# Patient Record
Sex: FEMALE | Race: WHITE | NOT HISPANIC OR LATINO | Employment: FULL TIME | URBAN - METROPOLITAN AREA
[De-identification: names, ages, dates, MRNs, and addresses within clinical notes are randomized per-mention and may not be internally consistent; named-entity substitution may affect disease eponyms.]

---

## 2023-03-08 ENCOUNTER — HOSPITAL ENCOUNTER (EMERGENCY)
Facility: HOSPITAL | Age: 23
Discharge: HOME | End: 2023-03-09
Attending: STUDENT IN AN ORGANIZED HEALTH CARE EDUCATION/TRAINING PROGRAM
Payer: COMMERCIAL

## 2023-03-08 DIAGNOSIS — R06.02 SOB (SHORTNESS OF BREATH): ICD-10-CM

## 2023-03-08 DIAGNOSIS — F41.9 ANXIETY: ICD-10-CM

## 2023-03-08 DIAGNOSIS — R07.9 CHEST PAIN: Primary | ICD-10-CM

## 2023-03-08 LAB
ALBUMIN SERPL-MCNC: 4.6 G/DL (ref 3.5–5)
ALBUMIN/GLOB SERPL: 1.6 RATIO (ref 1.1–2)
ALP SERPL-CCNC: 60 UNIT/L (ref 40–150)
ALT SERPL-CCNC: 11 UNIT/L (ref 0–55)
AST SERPL-CCNC: 16 UNIT/L (ref 5–34)
BILIRUBIN DIRECT+TOT PNL SERPL-MCNC: 1.8 MG/DL
BUN SERPL-MCNC: 5.5 MG/DL (ref 7–18.7)
CALCIUM SERPL-MCNC: 9.2 MG/DL (ref 8.4–10.2)
CHLORIDE SERPL-SCNC: 107 MMOL/L (ref 98–107)
CO2 SERPL-SCNC: 22 MMOL/L (ref 22–29)
CREAT SERPL-MCNC: 0.69 MG/DL (ref 0.55–1.02)
D DIMER PPP IA.FEU-MCNC: 0.53 UG/ML FEU (ref 0–0.5)
ERYTHROCYTE [DISTWIDTH] IN BLOOD BY AUTOMATED COUNT: 13.5 % (ref 11.5–17)
FLUAV AG UPPER RESP QL IA.RAPID: NOT DETECTED
FLUBV AG UPPER RESP QL IA.RAPID: NOT DETECTED
GFR SERPLBLD CREATININE-BSD FMLA CKD-EPI: >60 MLS/MIN/1.73/M2
GLOBULIN SER-MCNC: 2.9 GM/DL (ref 2.4–3.5)
GLUCOSE SERPL-MCNC: 90 MG/DL (ref 74–100)
HCT VFR BLD AUTO: 41.2 % (ref 37–47)
HGB BLD-MCNC: 13.7 G/DL (ref 12–16)
MCH RBC QN AUTO: 28.2 PG
MCHC RBC AUTO-ENTMCNC: 33.3 G/DL (ref 33–36)
MCV RBC AUTO: 84.9 FL (ref 80–94)
NEUTROPHILS NFR BLD AUTO: 53 %
NRBC BLD AUTO-RTO: 0 %
PLATELET # BLD AUTO: 222 X10(3)/MCL (ref 130–400)
PMV BLD AUTO: 9.7 FL (ref 7.4–10.4)
POTASSIUM SERPL-SCNC: 3.8 MMOL/L (ref 3.5–5.1)
PROT SERPL-MCNC: 7.5 GM/DL (ref 6.4–8.3)
RBC # BLD AUTO: 4.85 X10(6)/MCL (ref 4.2–5.4)
SARS-COV-2 RNA RESP QL NAA+PROBE: NOT DETECTED
SODIUM SERPL-SCNC: 136 MMOL/L (ref 136–145)
TROPONIN I SERPL-MCNC: <0.01 NG/ML (ref 0–0.04)
TSH SERPL-ACNC: 2.68 UIU/ML (ref 0.35–4.94)
WBC # SPEC AUTO: 6.4 X10(3)/MCL (ref 4.5–11.5)

## 2023-03-08 PROCEDURE — 85379 FIBRIN DEGRADATION QUANT: CPT | Performed by: STUDENT IN AN ORGANIZED HEALTH CARE EDUCATION/TRAINING PROGRAM

## 2023-03-08 PROCEDURE — 93010 ELECTROCARDIOGRAM REPORT: CPT | Mod: ,,, | Performed by: INTERNAL MEDICINE

## 2023-03-08 PROCEDURE — 99285 EMERGENCY DEPT VISIT HI MDM: CPT | Mod: 25

## 2023-03-08 PROCEDURE — 85025 COMPLETE CBC W/AUTO DIFF WBC: CPT

## 2023-03-08 PROCEDURE — 93005 ELECTROCARDIOGRAM TRACING: CPT

## 2023-03-08 PROCEDURE — 84484 ASSAY OF TROPONIN QUANT: CPT

## 2023-03-08 PROCEDURE — 0240U COVID/FLU A&B PCR: CPT

## 2023-03-08 PROCEDURE — 93010 EKG 12-LEAD: ICD-10-PCS | Mod: ,,, | Performed by: INTERNAL MEDICINE

## 2023-03-08 PROCEDURE — 80053 COMPREHEN METABOLIC PANEL: CPT

## 2023-03-08 PROCEDURE — 25000003 PHARM REV CODE 250: Performed by: STUDENT IN AN ORGANIZED HEALTH CARE EDUCATION/TRAINING PROGRAM

## 2023-03-08 PROCEDURE — 84443 ASSAY THYROID STIM HORMONE: CPT

## 2023-03-08 RX ORDER — ACETAMINOPHEN 500 MG
1000 TABLET ORAL
Status: COMPLETED | OUTPATIENT
Start: 2023-03-08 | End: 2023-03-08

## 2023-03-08 RX ADMIN — ACETAMINOPHEN 1000 MG: 500 TABLET ORAL at 10:03

## 2023-03-09 VITALS
DIASTOLIC BLOOD PRESSURE: 70 MMHG | RESPIRATION RATE: 14 BRPM | WEIGHT: 125 LBS | OXYGEN SATURATION: 100 % | HEART RATE: 72 BPM | BODY MASS INDEX: 20.83 KG/M2 | SYSTOLIC BLOOD PRESSURE: 121 MMHG | TEMPERATURE: 98 F | HEIGHT: 65 IN

## 2023-03-09 PROCEDURE — 25500020 PHARM REV CODE 255: Performed by: STUDENT IN AN ORGANIZED HEALTH CARE EDUCATION/TRAINING PROGRAM

## 2023-03-09 RX ORDER — ALPRAZOLAM 0.5 MG/1
0.5 TABLET ORAL 3 TIMES DAILY PRN
Qty: 15 TABLET | Refills: 0 | Status: SHIPPED | OUTPATIENT
Start: 2023-03-09

## 2023-03-09 RX ADMIN — IOPAMIDOL 100 ML: 755 INJECTION, SOLUTION INTRAVENOUS at 12:03

## 2023-03-09 NOTE — FIRST PROVIDER EVALUATION
"Medical screening examination initiated.  I have conducted a focused provider triage encounter, findings are as follows:    Brief history of present illness:  22 year old female presents to Er with intermittent left sided chest pain radiating to neck with intermittent SOB.    Vitals:    03/08/23 1812   BP: 122/80   BP Location: Left arm   Patient Position: Sitting   Pulse: 86   Resp: 19   Temp: 99.3 °F (37.4 °C)   SpO2: 99%   Weight: 56.7 kg (125 lb)   Height: 5' 5" (1.651 m)       Pertinent physical exam:  Awake and alert, nad    Brief workup plan:  Labs, EKG, CXR    Preliminary workup initiated; this workup will be continued and followed by the physician or advanced practice provider that is assigned to the patient when roomed.  "

## 2023-03-09 NOTE — ED PROVIDER NOTES
Encounter Date: 3/8/2023    SCRIBE #1 NOTE: I, Denise Elias, am scribing for, and in the presence of,  Reji Anderson IV, MD. I have scribed the following portions of the note - the EKG reading. Other sections scribed: HPI, ROS, PE, MDM.     History     Chief Complaint   Patient presents with    Chest Pain     C/o left sides chest tightness that radiates to the neck and LUE. Pt reports intermittent episodes for the last 3 days. Associated sx include SOB. Denies family hx of MI or cardiac history. Denies fever.      22 Y.O. female presents to the ED for L-sided CP onset 3 days ago. Pt states that the CP mostly occurs while in high stress conditions at work and radiates to her L neck and L arm; accompanied by SOB and palpitations. Further states that the pain also occurs with deep breaths. Reports pain relief if she puts pressure on her chest. Denies cough, leg swelling, N/V, and leg pain. Admits to 12 hour car ride two weeks ago. Further denies PMHx and recent surgery.    Per Chart Review: Seen at  for sore throat in December 2022.     The history is provided by the patient. No  was used.   Review of patient's allergies indicates:   Allergen Reactions    Aloe vera      No past medical history on file.  No past surgical history on file.  No family history on file.  Social History     Tobacco Use    Smoking status: Never    Smokeless tobacco: Never     Review of Systems   Constitutional:  Negative for chills and fever.   HENT:  Negative for congestion, rhinorrhea and sore throat.    Eyes:  Negative for visual disturbance.   Respiratory:  Positive for shortness of breath. Negative for cough.    Cardiovascular:  Positive for chest pain and palpitations. Negative for leg swelling.   Gastrointestinal:  Negative for abdominal pain, nausea and vomiting.   Genitourinary:  Negative for dysuria, hematuria, vaginal bleeding and vaginal discharge.   Musculoskeletal:  Negative for joint swelling.   Skin:   Negative for rash.   Neurological:  Negative for weakness.   Psychiatric/Behavioral:  Negative for confusion.      Physical Exam     Initial Vitals [03/08/23 1812]   BP Pulse Resp Temp SpO2   122/80 86 19 99.3 °F (37.4 °C) 99 %      MAP       --         Physical Exam    Nursing note and vitals reviewed.  Constitutional: She is not diaphoretic. No distress.   Cardiovascular:  Normal rate and regular rhythm.           No murmur heard.  Pulmonary/Chest: Breath sounds normal. No respiratory distress. She has no wheezes. She has no rales.   Abdominal: Abdomen is soft. She exhibits no distension. There is no abdominal tenderness.     Neurological: She is alert.   Psychiatric: She has a normal mood and affect.       ED Course   Procedures  Labs Reviewed   COMPREHENSIVE METABOLIC PANEL - Abnormal; Notable for the following components:       Result Value    Blood Urea Nitrogen 5.5 (*)     Bilirubin Total 1.8 (*)     All other components within normal limits   D DIMER, QUANTITATIVE - Abnormal; Notable for the following components:    D-Dimer 0.53 (*)     All other components within normal limits   TROPONIN I - Normal   TSH - Normal   COVID/FLU A&B PCR - Normal    Narrative:     The Xpert Xpress SARS-CoV-2/FLU/RSV plus is a rapid, multiplexed real-time PCR test intended for the simultaneous qualitative detection and differentiation of SARS-CoV-2, Influenza A, Influenza B, and respiratory syncytial virus (RSV) viral RNA in either nasopharyngeal swab or nasal swab specimens.         CBC W/ AUTO DIFFERENTIAL    Narrative:     The following orders were created for panel order CBC Auto Differential.  Procedure                               Abnormality         Status                     ---------                               -----------         ------                     CBC with Differential[[id number]]                            Final result                 Please view results for these tests on the individual orders.   CBC WITH  DIFFERENTIAL     EKG Readings: (Independently Interpreted)   Initial Reading: No STEMI. Rhythm: Normal Sinus Rhythm. Heart Rate: 79. Ectopy: No Ectopy. Conduction: Normal. ST Segments: Normal ST Segments. T Waves: Normal. Clinical Impression: Normal Sinus Rhythm   EKG performed at 1819 on 3/8/2023.   ECG Results              EKG 12-lead (Final result)  Result time 03/08/23 23:21:46      Final result by Interface, Lab In [facility] (03/08/23 23:21:46)                   Narrative:    Test Reason :     Vent. Rate : 079 BPM     Atrial Rate : 079 BPM     P-R Int : 130 ms          QRS Dur : 086 ms      QT Int : 386 ms       P-R-T Axes : 085 090 070 degrees     QTc Int : 442 ms    Normal sinus rhythm with sinus arrhythmia  Rightward axis  Borderline Abnormal ECG  When compared with ECG of 08-MAR-2023 18:18,  No significant change was found  Confirmed by Suleiman Zaldivar MD ([id number]) on 3/8/2023 11:21:34 PM    Referred By: MATTHEW DE LA PAZ           Confirmed By:Suleiman Zaldivar MD                                     EKG 12-lead (Final result)  Result time 03/08/23 23:21:41      Final result by Interface, Lab In [facility] (03/08/23 23:21:41)                   Narrative:    Test Reason : R07.9,    Vent. Rate : 078 BPM     Atrial Rate : 078 BPM     P-R Int : 134 ms          QRS Dur : 086 ms      QT Int : 380 ms       P-R-T Axes : 090 095 083 degrees     QTc Int : 433 ms     Suspect arm lead reversal, interpretation assumes no reversal  Normal sinus rhythm with sinus arrhythmia  Rightward axis  Borderline Abnormal ECG  No previous ECGs available  Confirmed by Suleiman Zaldivar MD ([id number]) on 3/8/2023 11:21:31 PM    Referred By:             Confirmed By:Suleiman Zaldivar MD                                  Imaging Results              CTA Chest Non-Coronary (PE Studies) (Preliminary result)  Result time 03/09/23 08:04:57      Preliminary result by Jorge Alberto Jay MD (03/09/23 08:04:57)                   Narrative:    START OF REPORT:  TECHNIQUE: CT SCAN OF  THE CHEST WAS PERFORMED WITH INTRAVENOUS CONTRAST WITH DIRECT AXIAL IMAGES AS WELL AS SAGITTAL AND CORONAL RECONSTRUCTION IMAGES PULMONARY EMBOLUS PROTOCOL.    DOSAGE INFORMATION: AUTOMATED EXPOSURE CONTROL WAS UTILIZED.    COMPARISON: NONE.    CLINICAL HISTORY: LEFT SIDE CHEST PAIN, SOB.    Findings:  Soft Tissues: Unremarkable.  Mediastinum: The mediastinal structures are within normal limits.  Heart: The heart size is within normal limits.  Aorta: Unremarkable appearing aorta.  Pulmonary Arteries: No filling defects are seen in the pulmonary arteries to suggest pulmonary embolus.  Lungs: The lungs are clear with no focal infiltrate or airspace disease. No acute focal infiltrate or consolidation is seen.  Pleura: No effusions or pneumothorax are identified.  Bony Structures: The visualized bony structures appear unremarkable.  Abdomen: The visualized upper abdominal organs appear unremarkable.      Impression:  1. No filling defects are seen in the pulmonary arteries to suggest pulmonary embolus.  2. No acute focal infiltrate or consolidation is seen.  3. No acute intrathoracic process identified. Details and other findings as discussed above.                          Preliminary result by Interface, Rad Results In (03/09/23 01:14:08)                   Narrative:    START OF REPORT:  TECHNIQUE: CT SCAN OF THE CHEST WAS PERFORMED WITH INTRAVENOUS CONTRAST WITH DIRECT AXIAL IMAGES AS WELL AS SAGITTAL AND CORONAL RECONSTRUCTION IMAGES PULMONARY EMBOLUS PROTOCOL.    DOSAGE INFORMATION: AUTOMATED EXPOSURE CONTROL WAS UTILIZED.    COMPARISON: NONE.    CLINICAL HISTORY: LEFT SIDE CHEST PAIN, SOB.    Findings:  Soft Tissues: Unremarkable.  Mediastinum: The mediastinal structures are within normal limits.  Heart: The heart size is within normal limits.  Aorta: Unremarkable appearing aorta.  Pulmonary Arteries: No filling defects are seen in the pulmonary arteries to suggest pulmonary embolus.  Lungs: The lungs are clear  with no focal infiltrate or airspace disease. No acute focal infiltrate or consolidation is seen.  Pleura: No effusions or pneumothorax are identified.  Bony Structures: The visualized bony structures appear unremarkable.  Abdomen: The visualized upper abdominal organs appear unremarkable.      Impression:  1. No filling defects are seen in the pulmonary arteries to suggest pulmonary embolus.  2. No acute focal infiltrate or consolidation is seen.  3. No acute intrathoracic process identified. Details and other findings as discussed above.                          Preliminary result by [email or web address], Rad Results In (03/09/23 01:14:08)                   Narrative:    START OF REPORT:  TECHNIQUE: CT SCAN OF THE CHEST WAS PERFORMED WITH INTRAVENOUS CONTRAST WITH DIRECT AXIAL IMAGES AS WELL AS SAGITTAL AND CORONAL RECONSTRUCTION IMAGES PULMONARY EMBOLUS PROTOCOL.    DOSAGE INFORMATION: AUTOMATED EXPOSURE CONTROL WAS UTILIZED.    COMPARISON: NONE.    CLINICAL HISTORY: LEFT SIDE CHEST PAIN, SOB.    Findings:  Soft Tissues: Unremarkable.  Mediastinum: The mediastinal structures are within normal limits.  Heart: The heart size is within normal limits.  Aorta: Unremarkable appearing aorta.  Pulmonary Arteries: No filling defects are seen in the pulmonary arteries to suggest pulmonary embolus.  Lungs: The lungs are clear with no focal infiltrate or airspace disease. No acute focal infiltrate or consolidation is seen.  Pleura: No effusions or pneumothorax are identified.  Bony Structures: The visualized bony structures appear unremarkable.  Abdomen: The visualized upper abdominal organs appear unremarkable.      Impression:  1. No filling defects are seen in the pulmonary arteries to suggest pulmonary embolus.  2. No acute focal infiltrate or consolidation is seen.  3. No acute intrathoracic process identified. Details and other findings as discussed above.                          Preliminary result by [email or web address], Rad Results In  (03/09/23 01:14:08)                   Narrative:    START OF REPORT:  TECHNIQUE: CT SCAN OF THE CHEST WAS PERFORMED WITH INTRAVENOUS CONTRAST WITH DIRECT AXIAL IMAGES AS WELL AS SAGITTAL AND CORONAL RECONSTRUCTION IMAGES PULMONARY EMBOLUS PROTOCOL.    DOSAGE INFORMATION: AUTOMATED EXPOSURE CONTROL WAS UTILIZED.    COMPARISON: NONE.    CLINICAL HISTORY: LEFT SIDE CHEST PAIN, SOB.    Findings:  Soft Tissues: Unremarkable.  Mediastinum: The mediastinal structures are within normal limits.  Heart: The heart size is within normal limits.  Aorta: Unremarkable appearing aorta.  Pulmonary Arteries: No filling defects are seen in the pulmonary arteries to suggest pulmonary embolus.  Lungs: The lungs are clear with no focal infiltrate or airspace disease. No acute focal infiltrate or consolidation is seen.  Pleura: No effusions or pneumothorax are identified.  Bony Structures: The visualized bony structures appear unremarkable.  Abdomen: The visualized upper abdominal organs appear unremarkable.      Impression:  1. No filling defects are seen in the pulmonary arteries to suggest pulmonary embolus.  2. No acute focal infiltrate or consolidation is seen.  3. No acute intrathoracic process identified. Details and other findings as discussed above.                          Preliminary result by Interface, Rad Results In (03/09/23 01:14:08)                   Narrative:    START OF REPORT:  TECHNIQUE: CT SCAN OF THE CHEST WAS PERFORMED WITH INTRAVENOUS CONTRAST WITH DIRECT AXIAL IMAGES AS WELL AS SAGITTAL AND CORONAL RECONSTRUCTION IMAGES PULMONARY EMBOLUS PROTOCOL.    DOSAGE INFORMATION: AUTOMATED EXPOSURE CONTROL WAS UTILIZED.    COMPARISON: NONE.    CLINICAL HISTORY: LEFT SIDE CHEST PAIN, SOB.    Findings:  Soft Tissues: Unremarkable.  Mediastinum: The mediastinal structures are within normal limits.  Heart: The heart size is within normal limits.  Aorta: Unremarkable appearing aorta.  Pulmonary Arteries: No filling defects are  seen in the pulmonary arteries to suggest pulmonary embolus.  Lungs: The lungs are clear with no focal infiltrate or airspace disease. No acute focal infiltrate or consolidation is seen.  Pleura: No effusions or pneumothorax are identified.  Bony Structures: The visualized bony structures appear unremarkable.  Abdomen: The visualized upper abdominal organs appear unremarkable.      Impression:  1. No filling defects are seen in the pulmonary arteries to suggest pulmonary embolus.  2. No acute focal infiltrate or consolidation is seen.  3. No acute intrathoracic process identified. Details and other findings as discussed above.                                         X-Ray Chest PA And Lateral (Final result)  Result time 03/08/23 18:33:12      Final result by Porfirio Hopper MD (03/08/23 18:33:12)                   Impression:      No acute abnormality.      Electronically signed by: Porfirio Hopper  Date:    03/08/2023  Time:    18:33               Narrative:    EXAMINATION:  XR CHEST PA AND LATERAL    CLINICAL HISTORY:  Shortness of breath    TECHNIQUE:  PA and lateral views of the chest were performed.    COMPARISON:  None    FINDINGS:  The lungs are clear, with normal appearance of pulmonary vasculature and no pleural effusion or pneumothorax.    The cardiac silhouette is normal in size. The hilar and mediastinal contours are unremarkable.    Bones are intact.                                       Medications   acetaminophen tablet 1,000 mg (1,000 mg Oral Given 3/8/23 2257)   iopamidoL (ISOVUE-370) injection 100 mL (100 mLs Intravenous Given 3/9/23 0012)         Medical Decision Making  Problems Addressed:  Chest pain: acute illness or injury that poses a threat to life or bodily functions  SOB (shortness of breath): acute illness or injury that poses a threat to life or bodily functions      ED assessment:    23 yo with no pMHx presenting with atypical chest pain, worse when stressed   EKG nonischemic,  trop negative, cxr unremarkable. D-dimer+, CTA negative for PE  C/w stress induced/anxiety, will discharge with short course of xanax, PCP follow up     Differential diagnosis:   Judging by the patient's chief complaint and pertinent history, the patient has the following possible differential diagnoses, including but not limited to the following.  Some of these are deemed to be lower likelihood and some more likely based on my physical exam and history combined with possible lab work and/or imaging studies.   Please see the pertinent studies, and refer to the HPI.  Some of these diagnoses will take further evaluation to fully rule out, perhaps as an outpatient and the patient was encouraged to follow up when discharged for more comprehensive evaluation.    Chest pain emergent diagnoses: ACS, PE, dissection, cardiac tamponade, tension pneumothorax.    Chest pain non-emergent diagnoses: Musculoskeletal, trauma, pleurisy, pneumonia, pleural effusion, GERD, other GI, neurologic, psychiatric and other non emergent diagnoses considered.        ED management:   Tylenol     My independent radiology interpretation:   CXR - no obvious infiltrates, consolidations, pleural effusions, or pneumothorax.      Amount and/or Complexity of Data Reviewed  External data reviewed: notes from clinic visits, prior labs, and prescription medications   Summary of data reviewed: Per Chart Review: Seen at  for sore throat in December 2022.  Risk and benefits of testing: discussed   Labs: ordered and reviewed  Radiology: ordered and independent interpretation performed (see above or ED course)  ECG/medicine tests: ordered and independent interpretation performed (see above or ED course)    Risk  Prescription drug management     Critical Care  none    IReji MD personally performed the history, PE, MDM, and procedures as documented above and agree with the scribe's documentation.         Scribe Attestation:   Scribe #1: I performed  the above scribed service and the documentation accurately describes the services I performed. I attest to the accuracy of the note.                   Clinical Impression:   Final diagnoses:  [R06.02] SOB (shortness of breath)  [R07.9] Chest pain (Primary)  [F41.9] Anxiety        ED Disposition Condition    Discharge Stable          ED Prescriptions       Medication Sig Dispense Start Date End Date Auth. Provider    ALPRAZolam (XANAX) 0.5 MG tablet Take 1 tablet (0.5 mg total) by mouth 3 (three) times daily as needed for Anxiety. 15 tablet 3/9/2023 -- Reji Anderson IV, MD          Follow-up Information       Follow up With Specialties Details Why Contact Info    Primary care physician  Schedule an appointment as soon as possible for a visit   Follow up with you primary care physician.   If you do not have a primary care physician call [phone] to schedule an appointment.    [facility] - Emergency Dept Emergency Medicine Go to   [address] [place] [id number] [phone]             Reji Anderson IV, MD  03/09/23 0867

## 2023-03-09 NOTE — DISCHARGE INSTRUCTIONS
Thanks for letting use take care of you today! It is our goal to give you courteous care and to keep you comfortable and informed. If you have any questions before you leave I will be happy to try and answer them.     Advice after your visit:  Your visit in the emergency department is NOT definitive care - please follow-up with your primary care doctor and/or specialist within 1-2 days. If you do not have a primary care physician call [phone] to schedule an appointment. Please return if you have any worsening in your condition or if you have any other concerns.    Return to the emergency department if any worsening symptoms including fever, chest pain, difficulty breathing, weakness, numbness, tingling, nausea, vomiting, inability to eat, drink or take your medication, or any other new symptoms or concerns arise.      Please signup for MyChart as noted below in your paperwork to review all labwork, imaging results, and any other incidental findings from today's visit.     If you had radiology exams like an XRAY or CT in the emergency Department the interpreation on them may be preliminary - there may be less time sensitive findings on the reports please obtain these reports within 24 hours from the hospital or by using your out on your mobile phone to access records.  Bring these to your primary care doctor and/or specialist for further review of incidental findings.    Please review any LAB WORK from your visit today with your primary care physician.    If you were prescribed OPIATE PAIN MEDICATION - please understand of these medications can be addictive, you may fill less of the prescription was written for, you do not have to take the full prescription.  You may discard what you do not use.  Please seek help if you feel you are having problems with addiction.  Do not drive or operate heavy machinery if you are taking sedating medications.  Do not mix these medications with alcohol.      If you had a SPLINT  placed in the emergency department if you have severe pain numbness tingling or discoloration of year digits please remove the splint and return to the emergency department for further evaluation as this may represent a sign of compromise to the nerves or blood vessels due to swelling.    If you had SUTURES in the emergency department please have them removed in the prescribed time frame typically within 7-14 days.  You may shower but please do not bathe or swim.  Keep the wounds clean and dry and covered with a clean dressing.  Please return if he have any signs of infection like redness or drainage or pain at the suture site.    Please take the full course of  any ANTIBIOTICS you were prescribed - incomplete courses of antibiotics can cause resistance to antibiotics in the future which will make it difficult to treat any infections you may have.